# Patient Record
Sex: FEMALE | Race: WHITE | NOT HISPANIC OR LATINO | Employment: OTHER | URBAN - NONMETROPOLITAN AREA
[De-identification: names, ages, dates, MRNs, and addresses within clinical notes are randomized per-mention and may not be internally consistent; named-entity substitution may affect disease eponyms.]

---

## 2017-06-27 ENCOUNTER — HOSPITAL ENCOUNTER (OUTPATIENT)
Dept: LAB | Facility: MEDICAL CENTER | Age: 82
End: 2017-06-27
Attending: INTERNAL MEDICINE
Payer: MEDICARE

## 2017-06-27 LAB
ANION GAP SERPL CALC-SCNC: 9 MMOL/L (ref 0–11.9)
BUN SERPL-MCNC: 26 MG/DL (ref 8–22)
CALCIUM SERPL-MCNC: 9.4 MG/DL (ref 8.5–10.5)
CHLORIDE SERPL-SCNC: 104 MMOL/L (ref 96–112)
CO2 SERPL-SCNC: 27 MMOL/L (ref 20–33)
CREAT SERPL-MCNC: 1.08 MG/DL (ref 0.5–1.4)
GFR SERPL CREATININE-BSD FRML MDRD: 48 ML/MIN/1.73 M 2
GLUCOSE SERPL-MCNC: 105 MG/DL (ref 65–99)
POTASSIUM SERPL-SCNC: 4 MMOL/L (ref 3.6–5.5)
SODIUM SERPL-SCNC: 140 MMOL/L (ref 135–145)
T4 FREE SERPL-MCNC: 1.12 NG/DL (ref 0.53–1.43)
TSH SERPL DL<=0.005 MIU/L-ACNC: 4.03 UIU/ML (ref 0.3–3.7)

## 2017-06-27 PROCEDURE — 84439 ASSAY OF FREE THYROXINE: CPT

## 2017-06-27 PROCEDURE — 84443 ASSAY THYROID STIM HORMONE: CPT

## 2017-06-27 PROCEDURE — 80048 BASIC METABOLIC PNL TOTAL CA: CPT

## 2017-06-27 PROCEDURE — 36415 COLL VENOUS BLD VENIPUNCTURE: CPT

## 2017-08-14 ENCOUNTER — RX ONLY (OUTPATIENT)
Age: 82
Setting detail: RX ONLY
End: 2017-08-14

## 2017-08-23 ENCOUNTER — OFFICE VISIT (OUTPATIENT)
Dept: URGENT CARE | Facility: PHYSICIAN GROUP | Age: 82
End: 2017-08-23
Payer: MEDICARE

## 2017-08-23 VITALS
DIASTOLIC BLOOD PRESSURE: 60 MMHG | TEMPERATURE: 97.3 F | RESPIRATION RATE: 12 BRPM | BODY MASS INDEX: 26.31 KG/M2 | OXYGEN SATURATION: 96 % | HEART RATE: 62 BPM | HEIGHT: 60 IN | WEIGHT: 134 LBS | SYSTOLIC BLOOD PRESSURE: 104 MMHG

## 2017-08-23 DIAGNOSIS — B02.9 HERPES ZOSTER WITHOUT COMPLICATION: ICD-10-CM

## 2017-08-23 PROCEDURE — 99214 OFFICE O/P EST MOD 30 MIN: CPT | Performed by: PHYSICIAN ASSISTANT

## 2017-08-23 RX ORDER — ACYCLOVIR 400 MG/1
TABLET ORAL
Qty: 70 TAB | Refills: 0 | Status: SHIPPED | OUTPATIENT
Start: 2017-08-23 | End: 2018-04-12

## 2017-08-23 ASSESSMENT — ENCOUNTER SYMPTOMS
MYALGIAS: 0
HEADACHES: 0
FEVER: 0
CHILLS: 0
COUGH: 0
TINGLING: 1

## 2017-08-23 NOTE — MR AVS SNAPSHOT
Vi Riojas   2017 8:50 AM   Office Visit   MRN: [id number]    Department:  [facility]   Dept Phone:  [phone]    Description:  Female : 3/8/1931   Provider:  Jacklyn Pacheco PA-C           Reason for Visit     Eye Problem Rt. Eye, red, swollen, itchy, Face Pain-numb-shingles      Allergies as of 2017     Allergen Noted Reactions    Pcn [Penicillins] 2015   Rash    Rash        You were diagnosed with     Herpes zoster without complication   [[id number]]         Vital Signs     Blood Pressure Pulse Temperature Respirations Height Weight    104/60 mmHg 62 36.3 °C (97.3 °F) 12 1.524 m (5') 60.782 kg (134 lb)    Body Mass Index Oxygen Saturation Smoking Status             26.17 kg/m2 96% Never Smoker          Basic Information     Date Of Birth Sex Race Ethnicity Preferred Language    3/8/1931 Female White Non- English      Problem List              ICD-10-CM Priority Class Noted - Resolved    Vaginitis N76.0   2015 - Present      Health Maintenance        Date Due Completion Dates    IMM DTaP/Tdap/Td Vaccine (1 - Tdap) 3/8/1950 ---    PAP SMEAR 3/8/1952 ---    MAMMOGRAM 3/8/1971 ---    COLONOSCOPY 3/8/1981 ---    IMM ZOSTER VACCINE 3/8/1991 ---    BONE DENSITY 3/8/1996 ---    IMM PNEUMOCOCCAL 65+ (ADULT) LOW/MEDIUM RISK SERIES (1 of 2 - PCV13) 3/8/1996 ---    IMM INFLUENZA (1) 2017 ---            Current Immunizations     No immunizations on file.      Below and/or attached are the medications your provider expects you to take. Review all of your home medications and newly ordered medications with your provider and/or pharmacist. Follow medication instructions as directed by your provider and/or pharmacist. Please keep your medication list with you and share with your provider. Update the information when medications are discontinued, doses are changed, or new medications (including over-the-counter products) are added; and carry medication information at all times  in the event of emergency situations     Allergies:  PCN - Rash               Medications  Valid as of: August 23, 2017 - 11:23 AM    Generic Name Brand Name Tablet Size Instructions for use    Acyclovir (Tab) ZOVIRAX 400 MG Take 800mg five times a day for seven days        Clotrimazole (Cream) LOTRIMIN 1 % Insert 1 Applicator in vagina every day.        Fluconazole (Tab) DIFLUCAN 150 MG Take 1 tab now and one in 72 hours if not resolved.        Ibuprofen   Take  by mouth.        Levothyroxine Sodium   Take  by mouth.        Metoprolol Succinate (TABLET SR 24 HR) TOPROL  MG Take 100 mg by mouth every day.        Naproxen Sodium   Take  by mouth.        Polymyxin B-Trimethoprim (Solution) POLYTRIM 98264-0.1 UNIT/ML-% Place 1 Drop in both eyes every 4 hours.        Potassium Chloride (Cap CR) MICRO-K 10 MEQ Take 10 mEq by mouth 2 times a day.        Red Yeast Rice Extract   Take  by mouth.        .                 Medicines prescribed today were sent to:     [facility] [address] [place] [address] [address] AT [place] [address] & [place]    [address] [facility] [address] [facility] [address]    Phone: [phone] Fax: [phone]    Open 24 Hours?: No      Medication refill instructions:       If your prescription bottle indicates you have medication refills left, it is not necessary to call your provider’s office. Please contact your pharmacy and they will refill your medication.    If your prescription bottle indicates you do not have any refills left, you may request refills at any time through one of the following ways: The online TechPepper system (except Urgent Care), by calling your provider’s office, or by asking your pharmacy to contact your provider’s office with a refill request. Medication refills are processed only during regular business hours and may not be available until the next business day. Your provider may request additional information or to have a follow-up visit with you prior to  refilling your medication.   *Please Note: Medication refills are assigned a new Rx number when refilled electronically. Your pharmacy may indicate that no refills were authorized even though a new prescription for the same medication is available at the pharmacy. Please request the medicine by name with the pharmacy before contacting your provider for a refill.           IronGate Access Code: [email or web address]  Expires: 9/22/2017  9:13 AM    Your email address is not on file at [facility].  Email Addresses are required for you to sign up for IronGate, please contact [phone] to verify your personal information and to provide your email address prior to attempting to register for IronGate.    Vi Riojas  [address] [place] [address]    IronGate  A secure, online tool to manage your health information     [facility]’s IronGate® is a secure, online tool that connects you to your personalized health information from the privacy of your home -- day or night - making it very easy for you to manage your healthcare. Once the activation process is completed, you can even access your medical information using the IronGate eli, which is available for free in the Apple Eli store or Google Play store.     To learn more about IronGate, visit [email or web address]    There are two levels of access available (as shown below):   My Chart Features  [facility] Primary [facility] Doctor [facility]  Specialists [facility]  Urgent  Care Non-[facility] Primary Care Doctor   Email your healthcare team securely and privately 24/7 X X X    Manage appointments: schedule your next appointment; view details of past/upcoming appointments X      Request prescription refills. X      View recent personal medical records, including lab and immunizations X X X X   View health record, including health history, allergies, medications X X X X   Read reports about your outpatient visits, procedures, consult and ER notes X X X X   See your discharge summary, which  is a recap of your hospital and/or ER visit that includes your diagnosis, lab results, and care plan X X  X     How to register for UseTogether:  Once your e-mail address has been verified, follow the following steps to sign up for UseTogether.     1. Go to  [email or web address]  2. Click on the Sign Up Now box, which takes you to the New Member Sign Up page. You will need to provide the following information:  a. Enter your UseTogether Access Code exactly as it appears at the top of this page. (You will not need to use this code after you’ve completed the sign-up process. If you do not sign up before the expiration date, you must request a new code.)   b. Enter your date of birth.   c. Enter your home email address.   d. Click Submit, and follow the next screen’s instructions.  3. Create a UseTogether ID. This will be your UseTogether login ID and cannot be changed, so think of one that is secure and easy to remember.  4. Create a UseTogether password. You can change your password at any time.  5. Enter your Password Reset Question and Answer. This can be used at a later time if you forget your password.   6. Enter your e-mail address. This allows you to receive e-mail notifications when new information is available in UseTogether.  7. Click Sign Up. You can now view your health information.    For assistance activating your UseTogether account, call [phone]

## 2017-08-23 NOTE — PROGRESS NOTES
Subjective:      Vi Riojas is a 86 y.o. 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 She states she had 3 years ago but never saw what looked like on her thoracic back. She states she's had tingling for the last few days. Notice a slight rash and wanted to get it checked out. She denies eye pain but states her right eye in her left eye felt very itchy. Denies discharge from the eye or significant swelling. Denies any known sick contacts. Denies fever, chills, nausea, vomiting or abdominal pain. Denies any recent respiratory symptoms    Past medical history: Is not pertinent to this examination  Past surgical history: Not pertinent to this examination  Family history: Is not pertinent to this examination  Allergies: No known drug allergies  Social history: Is reviewed in Epic      Review of Systems   Constitutional: Negative for fever and chills.   Respiratory: Negative for cough.    Cardiovascular: Negative for chest pain.   Musculoskeletal: Negative for myalgias.   Skin: Positive for itching and rash.   Neurological: Positive for tingling. Negative for headaches.          Objective:     /60 mmHg  Pulse 62  Temp(Src) 36.3 °C (97.3 °F)  Resp 12  Ht 1.524 m (5')  Wt 60.782 kg (134 lb)  BMI 26.17 kg/m2  SpO2 96%     Physical Exam       Gen.: Patient is A and O ×3, no acute distress, well-nourished well-hydrated  Vitals: Are listed and unremarkable  HEENT: Heads normocephalic, atraumatic, PERRLA, extraocular movements intact, TMs and oropharynx clear  Neck: Soft supple without cervical lymphadenopathy  Cardiovascular: Regular rate and rhythm normal S1 and S2. No murmurs, rubs or gallops  Lungs are clear to auscultation bilaterally. no wheezes rales or rhonchi  Abdomen is soft, nontender, nondistended with good bowel sounds, no  hepatosplenomegaly  Skin: Overall is pretty unremarkable. However she has a slight rash on the inferior aspect of her right mandible. Is not on an erythematous base but there are small little papules that appear to be starting to scab in a cluster. They do not cross the midline. There are very faint at this point   Neurological:  cranial nerves II through XII were assessed and intact.  Musculoskeletal: Full range of motion, 5 out of 5 strength against resistance  Neurovascularly: Intact with a 2 second cap refill, good distal pulses       Assessment/Plan:     1. Herpes zoster without complication  Patient requesting medication for shingles. Discussed with her that on the differential could also be a contact dermatitis or atopic dermatitis. However, this could be the start of shingles. Due to her age I will cover her with an antiviral. Did discuss following up with us in the next 48 hours if symptoms persist or worsen despite treatment. Please keep the area clean and can use emollient. Can use calamine lotion or Benadryl lotion if needed.  - acyclovir (ZOVIRAX) 400 MG tablet; Take 800mg five times a day for seven days  Dispense: 70 Tab; Refill: 0

## 2017-10-31 ENCOUNTER — SUPERVISING PHYSICIAN REVIEW (OUTPATIENT)
Dept: URGENT CARE | Facility: PHYSICIAN GROUP | Age: 82
End: 2017-10-31

## 2018-04-12 ENCOUNTER — OFFICE VISIT (OUTPATIENT)
Dept: MEDICAL GROUP | Facility: PHYSICIAN GROUP | Age: 83
End: 2018-04-12
Payer: MEDICARE

## 2018-04-12 VITALS
OXYGEN SATURATION: 97 % | SYSTOLIC BLOOD PRESSURE: 120 MMHG | RESPIRATION RATE: 16 BRPM | HEIGHT: 60 IN | HEART RATE: 76 BPM | WEIGHT: 142 LBS | DIASTOLIC BLOOD PRESSURE: 76 MMHG | TEMPERATURE: 97.1 F | BODY MASS INDEX: 27.88 KG/M2

## 2018-04-12 DIAGNOSIS — R00.0 TACHYCARDIA: ICD-10-CM

## 2018-04-12 DIAGNOSIS — M19.90 ARTHRITIS: ICD-10-CM

## 2018-04-12 DIAGNOSIS — E03.9 ACQUIRED HYPOTHYROIDISM: ICD-10-CM

## 2018-04-12 PROCEDURE — 99214 OFFICE O/P EST MOD 30 MIN: CPT | Performed by: NURSE PRACTITIONER

## 2018-04-12 ASSESSMENT — PATIENT HEALTH QUESTIONNAIRE - PHQ9: CLINICAL INTERPRETATION OF PHQ2 SCORE: 0

## 2018-04-12 NOTE — PROGRESS NOTES
No chief complaint on file.        This is a 87 y.o.female patient that presents today with the following:chronic conditions, DMV form    Tachycardia  Pt had history of tachycardia for several years, ever since she was a teenage and has been on metoprolol for this. She tolerates medication well with no side effects. She avoid caffeine.     Acquired hypothyroidism  This is a chronic condition, stable and well controlled with levothyroxine 50 mcg daily. She has upcoming appointment with her PCP who checks labs annually and they have been normal. She denies symptoms or hypothyroidism.    Arthritis  Pt had arthritis in multiple joints, bilateral hands, knees and L hip. She takes ibuprofen and this controls her pain well.      No visits with results within 1 Month(s) from this visit.   Latest known visit with results is:   Hospital Outpatient Visit on 06/27/2017   Component Date Value   • Sodium 06/27/2017 140    • Potassium 06/27/2017 4.0    • Chloride 06/27/2017 104    • Co2 06/27/2017 27    • Glucose 06/27/2017 105*   • Bun 06/27/2017 26*   • Creatinine 06/27/2017 1.08    • Calcium 06/27/2017 9.4    • Anion Gap 06/27/2017 9.0    • TSH 06/27/2017 4.030*   • Free T-4 06/27/2017 1.12    • GFR If  06/27/2017 58*   • GFR If Non  Ameri* 06/27/2017 48*         clinical course has been stable    Past Medical History:   Diagnosis Date   • Arthritis    • Hypothyroid    • Tachycardia        No past surgical history on file.    No family history on file.    Pcn [penicillins]    Current Outpatient Prescriptions Ordered in [facility]   Medication Sig Dispense Refill   • Multiple Vitamins-Minerals (OCUVITE ADULT 50+ PO) Take  by mouth.     • Calcium Carb-Cholecalciferol (CALCIUM/VITAMIN D PO) Take  by mouth.     • Non Formulary Request      • potassium chloride (MICRO-K) 10 MEQ capsule Take 10 mEq by mouth 2 times a day.     • metoprolol SR (TOPROL XL) 100 MG TABLET SR 24 HR Take 100 mg by mouth every day.     •  LEVOTHYROXINE SODIUM PO Take 50 mg by mouth every day.       No current Epic-ordered facility-administered medications on file.        Constitutional ROS: No unexpected change in weight, No weakness, No unexplained fevers, sweats, or chills  Pulmonary ROS: No chronic cough, sputum, or hemoptysis, No shortness of breath, No recent change in breathing  Cardiovascular ROS: No chest pain, No edema, No palpitations, Positive for tachycardia, per HPI  Gastrointestinal ROS: No abdominal pain, No nausea, vomiting, diarrhea, or constipation  Musculoskeletal/Extremities ROS: No clubbing, No peripheral edema, No pain, redness or swelling on the joints  Neurologic ROS: Normal development, No seizures, No weakness  Endocrine ROS: positive per HPI    Physical exam:  /76   Pulse 76   Temp 36.2 °C (97.1 °F)   Resp 16   Ht 1.524 m (5')   Wt 64.4 kg (142 lb)   SpO2 97%   BMI 27.73 kg/m²   General Appearance: Elderly female appearing younger than stated age alert, no distress, well-nourished, well-groomed  Skin: Skin color, texture, turgor normal. No rashes or lesions.  Lungs: negative findings: normal respiratory rate and rhythm, lungs clear to auscultation  Heart: negative. RRR without murmur, gallop, or rubs.  No ectopy.  Abdomen: Abdomen soft, non-tender. BS normal. No masses,  No organomegaly  Musculoskeletal: negative findings: ROM of all joints is normal, strength normal, no deformities present  Neurologic: intact, oriented, mood appropriate, judgment intact. Cranial nerves II through XII grossly intact    Medical decision making/discussion: Patient presents today to have DMV form filled out to say that with her chronic conditions she is still safe to drive. I did discuss with her that I will fill her form out but I will indicate on the form that I have never seen her before today and that this will be based on today's history and physical only. She does understand and agrees with plan.   She does have  hypothyroidism and tachycardia that has been treated for several years with levothyroxine and metoprolol respectively. She is normally followed by PCP outside of [facility], but she was unable to get in with her regular physician and her form is due to . She is to follow-up with her PCP for ongoing care.    .Diagnoses and all orders for this visit:    Acquired hypothyroidism    Tachycardia    Arthritis          Please note that this dictation was created using voice recognition software. I have made every reasonable attempt to correct obvious errors, but I expect that there are errors of grammar and possibly content that I did not discover before finalizing the note.

## 2018-04-12 NOTE — ASSESSMENT & PLAN NOTE
Pt had arthritis in multiple joints, bilateral hands, knees and L hip. She takes ibuprofen and this controls her pain well.

## 2018-04-12 NOTE — ASSESSMENT & PLAN NOTE
This is a chronic condition, stable and well controlled with levothyroxine 50 mcg daily. She has upcoming appointment with her PCP who checks labs annually and they have been normal. She denies symptoms or hypothyroidism.

## 2018-04-12 NOTE — ASSESSMENT & PLAN NOTE
Pt had history of tachycardia for several years, ever since she was a teenage and has been on metoprolol for this. She tolerates medication well with no side effects. She avoid caffeine.

## 2019-03-06 ENCOUNTER — OFFICE VISIT (OUTPATIENT)
Dept: URGENT CARE | Facility: PHYSICIAN GROUP | Age: 84
End: 2019-03-06
Payer: MEDICARE

## 2019-03-06 VITALS
RESPIRATION RATE: 14 BRPM | DIASTOLIC BLOOD PRESSURE: 78 MMHG | OXYGEN SATURATION: 94 % | WEIGHT: 137 LBS | BODY MASS INDEX: 26.76 KG/M2 | SYSTOLIC BLOOD PRESSURE: 126 MMHG | HEART RATE: 54 BPM | TEMPERATURE: 97.7 F

## 2019-03-06 DIAGNOSIS — J22 ACUTE LOWER RESPIRATORY INFECTION: ICD-10-CM

## 2019-03-06 DIAGNOSIS — R05.9 COUGH: ICD-10-CM

## 2019-03-06 PROCEDURE — 99214 OFFICE O/P EST MOD 30 MIN: CPT | Performed by: FAMILY MEDICINE

## 2019-03-06 RX ORDER — AZITHROMYCIN 250 MG/1
TABLET, FILM COATED ORAL
Qty: 6 TAB | Refills: 0 | Status: SHIPPED | OUTPATIENT
Start: 2019-03-06

## 2019-03-06 RX ORDER — BENZONATATE 200 MG/1
CAPSULE ORAL
Qty: 21 CAP | Refills: 0 | Status: SHIPPED | OUTPATIENT
Start: 2019-03-06

## 2019-03-06 NOTE — PROGRESS NOTES
Chief Complaint:    Chief Complaint   Patient presents with   • Cough   • Pharyngitis     x 1 week        History of Present Illness:    This is a new problem. She has sore throat and cough x 1 week, overall at least moderate severity and not getting better. Cough kept her up all night last night. She reports she has an infection and needs something to make her better.      Review of Systems:    Constitutional: Negative for fever, chills, and diaphoresis.   Eyes: Negative for change in vision, photophobia, pain, redness, and discharge.  ENT: See HPI.  Respiratory: See HPI.    Cardiovascular: Negative for chest pain, palpitations, orthopnea, claudication, leg swelling, and PND.   Gastrointestinal: Negative for abdominal pain, nausea, vomiting, diarrhea, constipation, blood in stool, and melena.   Genitourinary: Negative for dysuria, urinary urgency, urinary frequency, hematuria, and flank pain.   Musculoskeletal: Negative for myalgias, joint pain, neck pain, and back pain.   Skin: Negative for rash and itching.   Neurological: Negative for dizziness, tingling, tremors, sensory change, speech change, focal weakness, seizures, loss of consciousness, and headaches.   Endo: Negative for polydipsia.   Heme: Does not bruise/bleed easily.   Psychiatric/Behavioral: Negative for depression, suicidal ideas, hallucinations, memory loss and substance abuse. The patient is not nervous/anxious and does not have insomnia.        Past Medical History:    Past Medical History:   Diagnosis Date   • Arthritis    • Hypothyroid    • Tachycardia      Past Surgical History:    No past surgical history on file.    Social History:    Social History     Social History   • Marital status:      Spouse name: N/A   • Number of children: N/A   • Years of education: N/A     Occupational History   • Not on file.     Social History Main Topics   • Smoking status: Never Smoker   • Smokeless tobacco: Never Used   • Alcohol use No   • Drug use:  No   • Sexual activity: Not Currently     Partners: Male     Other Topics Concern   • Not on file     Social History Narrative   • No narrative on file     Family History:    No family history on file.    Medications:    Current Outpatient Prescriptions on File Prior to Visit   Medication Sig Dispense Refill   • Multiple Vitamins-Minerals (OCUVITE ADULT 50+ PO) Take  by mouth.     • Calcium Carb-Cholecalciferol (CALCIUM/VITAMIN D PO) Take  by mouth.     • potassium chloride (MICRO-K) 10 MEQ capsule Take 10 mEq by mouth 2 times a day.     • metoprolol SR (TOPROL XL) 100 MG TABLET SR 24 HR Take 100 mg by mouth every day.     • LEVOTHYROXINE SODIUM PO Take 50 mg by mouth every day.     • Non Formulary Request        No current facility-administered medications on file prior to visit.      Allergies:    Allergies   Allergen Reactions   • Pcn [Penicillins] Rash     Rash         Vitals:    Vitals:    03/06/19 1020   BP: 126/78   Pulse: (!) 54   Resp: 14   Temp: 36.5 °C (97.7 °F)   TempSrc: Temporal   SpO2: 94%   Weight: 62.1 kg (137 lb)       Physical Exam:    Constitutional: Vital signs reviewed. Appears well-developed and well-nourished. Occl cough. No acute distress.   Eyes: Sclera white, conjunctivae clear.   ENT: External ears normal. Hearing grossly decreased, wears bilateral hearing aids that are squealing in room. Nasal mucosa pink. Lips/teeth are normal. Oral mucosa pink and moist. Posterior pharynx: WNL.  Neck: Neck supple.   Cardiovascular: Regular rate and rhythm with 2/6 SE murmur.  Pulmonary/Chest: Respirations non-labored. Mild-moderate coarse breath sounds bilaterally.  Lymph: Cervical nodes without tenderness or enlargement.  Musculoskeletal: Normal gait. No muscular atrophy or weakness.  Neurological: Alert and oriented to person, place, and time. Muscle tone normal. Coordination normal.   Skin: No rashes or lesions. Warm, dry, normal turgor.  Psychiatric: Normal mood and affect. Behavior is normal.  Judgment and thought content normal.       Assessment / Plan:    1. Acute lower respiratory infection  - azithromycin (ZITHROMAX) 250 MG Tab; 2 TABS ON DAY 1, 1 TAB ON DAYS 2-5.  Dispense: 6 Tab; Refill: 0    2. Cough  - benzonatate (TESSALON) 200 MG capsule; 1 CAP UP TO 3 TIMES A DAY ONLY IF NEEDED FOR COUGH.  Dispense: 21 Cap; Refill: 0      Discussed with her DDX, management options, and risks, benefits, and alternatives to treatment plan agreed upon.    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    I informed her that I was not denying that she had symptoms and I wasn't denying any prescriptions, but I was just letting her know her throat did not look bad on exam.    The more I tried to reason with her, the more angry she seemed to get.    I will go ahead and Rx her antibiotic and medication for cough.    Agreeable to medications prescribed.    Discussed expected course of duration, time for improvement, and to seek follow-up in Emergency Room, urgent care, or with PCP if getting worse at any time or not improving within expected time frame.

## 2021-01-11 DIAGNOSIS — Z23 NEED FOR VACCINATION: ICD-10-CM
